# Patient Record
Sex: FEMALE | Race: WHITE
[De-identification: names, ages, dates, MRNs, and addresses within clinical notes are randomized per-mention and may not be internally consistent; named-entity substitution may affect disease eponyms.]

---

## 2018-10-04 ENCOUNTER — RX RENEWAL (OUTPATIENT)
Age: 53
End: 2018-10-04

## 2018-10-04 PROBLEM — Z00.00 ENCOUNTER FOR PREVENTIVE HEALTH EXAMINATION: Status: ACTIVE | Noted: 2018-10-04

## 2018-11-02 ENCOUNTER — APPOINTMENT (OUTPATIENT)
Dept: ENDOCRINOLOGY | Facility: CLINIC | Age: 53
End: 2018-11-02
Payer: SELF-PAY

## 2018-11-02 VITALS
HEIGHT: 64 IN | HEART RATE: 88 BPM | SYSTOLIC BLOOD PRESSURE: 128 MMHG | WEIGHT: 170 LBS | BODY MASS INDEX: 29.02 KG/M2 | DIASTOLIC BLOOD PRESSURE: 70 MMHG

## 2018-11-02 DIAGNOSIS — Z85.850 PERSONAL HISTORY OF MALIGNANT NEOPLASM OF THYROID: ICD-10-CM

## 2018-11-02 DIAGNOSIS — E03.2 HYPOTHYROIDISM DUE TO MEDICAMENTS AND OTHER EXOGENOUS SUBSTANCES: ICD-10-CM

## 2018-11-02 PROCEDURE — 99213 OFFICE O/P EST LOW 20 MIN: CPT

## 2019-01-02 ENCOUNTER — RX RENEWAL (OUTPATIENT)
Age: 54
End: 2019-01-02

## 2019-01-17 ENCOUNTER — RECORD ABSTRACTING (OUTPATIENT)
Age: 54
End: 2019-01-17

## 2019-01-17 DIAGNOSIS — Z82.3 FAMILY HISTORY OF STROKE: ICD-10-CM

## 2019-01-17 DIAGNOSIS — Z83.3 FAMILY HISTORY OF DIABETES MELLITUS: ICD-10-CM

## 2019-01-17 DIAGNOSIS — Z82.49 FAMILY HISTORY OF ISCHEMIC HEART DISEASE AND OTHER DISEASES OF THE CIRCULATORY SYSTEM: ICD-10-CM

## 2019-01-17 DIAGNOSIS — Z86.39 PERSONAL HISTORY OF OTHER ENDOCRINE, NUTRITIONAL AND METABOLIC DISEASE: ICD-10-CM

## 2019-01-30 ENCOUNTER — APPOINTMENT (OUTPATIENT)
Dept: ENDOCRINOLOGY | Facility: CLINIC | Age: 54
End: 2019-01-30
Payer: MEDICAID

## 2019-01-30 ENCOUNTER — LABORATORY RESULT (OUTPATIENT)
Age: 54
End: 2019-01-30

## 2019-01-30 VITALS
BODY MASS INDEX: 29.88 KG/M2 | WEIGHT: 175 LBS | HEIGHT: 64 IN | DIASTOLIC BLOOD PRESSURE: 74 MMHG | OXYGEN SATURATION: 96 % | SYSTOLIC BLOOD PRESSURE: 128 MMHG | HEART RATE: 99 BPM

## 2019-01-30 PROCEDURE — 99213 OFFICE O/P EST LOW 20 MIN: CPT | Mod: 25

## 2019-01-30 PROCEDURE — 36415 COLL VENOUS BLD VENIPUNCTURE: CPT

## 2019-01-31 LAB
T4 FREE SERPL-MCNC: 1.7 NG/DL
THYROGLOB AB SERPL-ACNC: <20 IU/ML
THYROGLOB SERPL-MCNC: <0.2 NG/ML
TSH SERPL-ACNC: 8.12 UIU/ML

## 2019-02-01 ENCOUNTER — MEDICATION RENEWAL (OUTPATIENT)
Age: 54
End: 2019-02-01

## 2019-02-01 RX ORDER — LEVOTHYROXINE SODIUM 0.17 MG/1
175 TABLET ORAL DAILY
Qty: 90 | Refills: 0 | Status: DISCONTINUED | COMMUNITY
Start: 2019-01-02 | End: 2019-02-01

## 2019-04-03 ENCOUNTER — APPOINTMENT (OUTPATIENT)
Dept: ENDOCRINOLOGY | Facility: CLINIC | Age: 54
End: 2019-04-03
Payer: MEDICAID

## 2019-04-03 ENCOUNTER — LABORATORY RESULT (OUTPATIENT)
Age: 54
End: 2019-04-03

## 2019-04-03 VITALS
HEART RATE: 99 BPM | SYSTOLIC BLOOD PRESSURE: 120 MMHG | HEIGHT: 64 IN | BODY MASS INDEX: 30.05 KG/M2 | DIASTOLIC BLOOD PRESSURE: 80 MMHG | WEIGHT: 176 LBS

## 2019-04-03 PROCEDURE — 36415 COLL VENOUS BLD VENIPUNCTURE: CPT

## 2019-04-03 PROCEDURE — 99213 OFFICE O/P EST LOW 20 MIN: CPT | Mod: 25

## 2019-05-21 ENCOUNTER — APPOINTMENT (OUTPATIENT)
Dept: ENDOCRINOLOGY | Facility: CLINIC | Age: 54
End: 2019-05-21

## 2019-06-17 LAB
T4 FREE SERPL-MCNC: 2 NG/DL
THYROGLOB AB SERPL-ACNC: <20 IU/ML
THYROGLOB SERPL-MCNC: <0.2 NG/ML
TSH SERPL-ACNC: 0.19 UIU/ML

## 2019-07-30 ENCOUNTER — RX RENEWAL (OUTPATIENT)
Age: 54
End: 2019-07-30

## 2019-07-30 RX ORDER — LEVOTHYROXINE SODIUM 0.2 MG/1
200 TABLET ORAL DAILY
Qty: 90 | Refills: 1 | Status: DISCONTINUED | COMMUNITY
Start: 2019-02-01 | End: 2019-07-30

## 2019-10-15 ENCOUNTER — RX RENEWAL (OUTPATIENT)
Age: 54
End: 2019-10-15

## 2019-11-15 ENCOUNTER — LABORATORY RESULT (OUTPATIENT)
Age: 54
End: 2019-11-15

## 2019-11-15 ENCOUNTER — APPOINTMENT (OUTPATIENT)
Dept: ENDOCRINOLOGY | Facility: CLINIC | Age: 54
End: 2019-11-15
Payer: COMMERCIAL

## 2019-11-15 VITALS
DIASTOLIC BLOOD PRESSURE: 68 MMHG | WEIGHT: 168 LBS | HEIGHT: 55 IN | BODY MASS INDEX: 38.88 KG/M2 | HEART RATE: 86 BPM | SYSTOLIC BLOOD PRESSURE: 120 MMHG

## 2019-11-15 PROCEDURE — 99406 BEHAV CHNG SMOKING 3-10 MIN: CPT

## 2019-11-15 PROCEDURE — 36415 COLL VENOUS BLD VENIPUNCTURE: CPT

## 2019-11-15 PROCEDURE — 99214 OFFICE O/P EST MOD 30 MIN: CPT | Mod: 25

## 2019-11-15 RX ORDER — CALCIUM CITRATE/VITAMIN D3 315MG-6.25
TABLET ORAL DAILY
Refills: 0 | Status: ACTIVE | COMMUNITY

## 2019-11-15 NOTE — REASON FOR VISIT
[Follow-Up: _____] : a [unfilled] follow-up visit [FreeTextEntry1] : thyroid cancer, post surgical hypothyroid

## 2019-11-15 NOTE — ASSESSMENT
[FreeTextEntry1] : 53 year old female with PTC s/p thyroidectomy and WADDELL ablation (79 mCi) 1/2015 at [place]. Post treatment scan showed foci of uptake in neck- thyroglossal duct and thyroid bed remnant. Neck sono 2017 with residual thyroid tissue. Tg and TgAb levels negative\par -Continue Synthroid 200 mcg daily\par -Check TSH, Tg, and Tg ab now in office\par -Will adjust Synthroid as needed.\par - neck sono needed\par \par Smoker - I discussed at length with the patient concerning cessation of tobacco use.  I reviewed with the patient the adverse affects that smoking has on vascular health, morbidity and mortality. We discussed the use of nicotine replacement, psychiatric counseling and medical therapy.  Counseling time was 3 minutes.  Educational material provided.\par

## 2019-11-15 NOTE — PHYSICAL EXAM
[Alert] : alert [No Acute Distress] : no acute distress [Well Nourished] : well nourished [Well Developed] : well developed [Normal Sclera/Conjunctiva] : normal sclera/conjunctiva [EOMI] : extra ocular movement intact [No Proptosis] : no proptosis [No LAD] : no lymphadenopathy [Well Healed Scar] : well healed scar [Normal Rate] : heart rate was normal  [Normal S1, S2] : normal S1 and S2 [Regular Rhythm] : with a regular rhythm [No Edema] : there was no peripheral edema [Anterior Cervical Nodes] : anterior cervical nodes [Normal] : normal and non tender [No Tremors] : no tremors [Oriented x3] : oriented to person, place, and time [Normal Insight/Judgement] : insight and judgment were intact [Normal Affect] : the affect was normal [Normal Mood] : the mood was normal [de-identified] : bilateral wheeze [de-identified] : no palpable thyroid tissue

## 2019-11-15 NOTE — HISTORY OF PRESENT ILLNESS
[FreeTextEntry1] : Current regimen:\par Synthroid (KALANI) 200 mcg daily, adherent with dosing and administration\par \par Denies dysphagia, anterior neck pain. Reports intermittent voice hoarseness ever since thyroidectomy.\par  [de-identified] : 3/2014: [facility] thyroid FNA- intermediate pathology with suspicious GEC\par \par 6/2/2014: Left hemithyroidectomy, left 1.3 cm and 2 mm papillary thyroid cancer, follicular variant, no lymph vascular invasion, partially encapsulated\par 7/24/2014: right completion thyroidectomy, papillary microcarcinoma x4 measuring 2mm each.\par No lymphovascular invasion\par \par 1/15/15 WADDELL ablation  76.9 mCi at [facility]\par Post treatment scan: foci of uptake in neck- thyroglossal duct and thyroid bed remnant. Tg 7.4, Tg ab neg\par \par Neck sono 12/2017 - residual Left thyroid tissue 1.6x.6x.6 cm, Left neck 4 lymph nodes\par

## 2019-11-15 NOTE — REVIEW OF SYSTEMS
[As Noted in HPI] : as noted in HPI [Anxiety] : anxiety [Fatigue] : no fatigue [Recent Weight Gain (___ Lbs)] : no recent weight gain [Recent Weight Loss (___ Lbs)] : no recent weight loss [Blurry Vision] : no blurred vision [Eye Pain] : no eye pain [Chest Pain] : no chest pain [Palpitations] : no palpitations [Constipation] : no constipation [Diarrhea] : no diarrhea [Hair Loss] : no hair loss [Dry Skin] : no dry skin [Tremors] : no tremors [Dizziness] : no dizziness [Insomnia] : no insomnia [Cold Intolerance] : cold tolerant [Heat Intolerance] : heat tolerant

## 2019-11-22 LAB
T4 FREE SERPL-MCNC: 1.7 NG/DL
THYROGLOB AB SERPL-ACNC: <20 IU/ML
THYROGLOB SERPL-MCNC: <0.2 NG/ML
TSH SERPL-ACNC: 0.13 UIU/ML

## 2019-12-05 ENCOUNTER — CLINICAL ADVICE (OUTPATIENT)
Age: 54
End: 2019-12-05

## 2020-01-07 ENCOUNTER — CLINICAL ADVICE (OUTPATIENT)
Age: 55
End: 2020-01-07

## 2020-01-09 ENCOUNTER — RX RENEWAL (OUTPATIENT)
Age: 55
End: 2020-01-09

## 2020-03-17 ENCOUNTER — APPOINTMENT (OUTPATIENT)
Dept: ENDOCRINOLOGY | Facility: CLINIC | Age: 55
End: 2020-03-17

## 2020-03-24 ENCOUNTER — APPOINTMENT (OUTPATIENT)
Dept: ENDOCRINOLOGY | Facility: CLINIC | Age: 55
End: 2020-03-24
Payer: COMMERCIAL

## 2020-03-24 PROCEDURE — 99441: CPT

## 2020-07-28 ENCOUNTER — LABORATORY RESULT (OUTPATIENT)
Age: 55
End: 2020-07-28

## 2020-07-28 ENCOUNTER — APPOINTMENT (OUTPATIENT)
Dept: ENDOCRINOLOGY | Facility: CLINIC | Age: 55
End: 2020-07-28
Payer: MEDICAID

## 2020-07-28 PROCEDURE — 36415 COLL VENOUS BLD VENIPUNCTURE: CPT

## 2020-07-30 LAB
T4 FREE SERPL-MCNC: 1.1 NG/DL
THYROGLOB AB SERPL-ACNC: <20 IU/ML
THYROGLOB SERPL-MCNC: 0.21 NG/ML
TSH SERPL-ACNC: 41 UIU/ML

## 2020-08-04 ENCOUNTER — APPOINTMENT (OUTPATIENT)
Dept: ENDOCRINOLOGY | Facility: CLINIC | Age: 55
End: 2020-08-04

## 2020-08-18 ENCOUNTER — APPOINTMENT (OUTPATIENT)
Dept: ENDOCRINOLOGY | Facility: CLINIC | Age: 55
End: 2020-08-18
Payer: MEDICAID

## 2020-08-18 VITALS
DIASTOLIC BLOOD PRESSURE: 64 MMHG | SYSTOLIC BLOOD PRESSURE: 110 MMHG | HEIGHT: 55 IN | OXYGEN SATURATION: 96 % | TEMPERATURE: 98.6 F | BODY MASS INDEX: 39.11 KG/M2 | WEIGHT: 169 LBS | HEART RATE: 89 BPM

## 2020-08-18 PROCEDURE — 99214 OFFICE O/P EST MOD 30 MIN: CPT

## 2020-08-18 NOTE — PHYSICAL EXAM
[Alert] : alert [Well Nourished] : well nourished [Healthy Appearance] : healthy appearance [No Acute Distress] : no acute distress [EOMI] : extra ocular movement intact [No LAD] : no lymphadenopathy [Well Healed Scar] : well healed scar [No Accessory Muscle Use] : no accessory muscle use [Clear to Auscultation] : lungs were clear to auscultation bilaterally [Normal S1, S2] : normal S1 and S2 [Normal Rate] : heart rate was normal [No Edema] : no peripheral edema [Normal Bowel Sounds] : normal bowel sounds [Not Tender] : non-tender [Cranial Nerves Intact] : cranial nerves 2-12 were intact [Soft] : abdomen soft [Normal Reflexes] : deep tendon reflexes were 2+ and symmetric [No Tremors] : no tremors [Oriented x3] : oriented to person, place, and time [Normal Affect] : the affect was normal [Normal Insight/Judgement] : insight and judgment were intact [Normal Mood] : the mood was normal [de-identified] : mild periorbital edema

## 2020-08-18 NOTE — HISTORY OF PRESENT ILLNESS
[de-identified] : 3/2014: [facility] thyroid FNA- intermediate pathology with suspicious GEC\par \par 6/2/2014: Left hemithyroidectomy, left 1.3 cm and 2 mm papillary thyroid cancer, follicular variant, no lymph vascular invasion, partially encapsulated\par 7/24/2014: right completion thyroidectomy, papillary microcarcinoma x4 measuring 2mm each.\par No lymphovascular invasion\par \par 1/15/15 WADDELL ablation  76.9 mCi at [facility]\par Post treatment scan: foci of uptake in neck- thyroglossal duct and thyroid bed remnant. Tg 7.4, Tg ab neg\par \par Neck sono 12/2017 - residual Left thyroid tissue 1.6x.6x.6 cm, Left neck 4 lymph nodes\par Neck sono 1/2/20: residual Left thyroid tissue 19x6x6 mm, no lymphadenopathy\par \par Current regimen: Synthroid (KALANI) 200 mcg daily, has been nonadherent, missed a few days\par \par Current sx: Denies dysphagia, anterior neck pain. Reports intermittent voice hoarseness ever since thyroidectomy.\par \par

## 2020-08-18 NOTE — ASSESSMENT
[FreeTextEntry1] : 53 year old female with PTC s/p thyroidectomy and WADDELL ablation (79 mCi) 1/2015 at [place]. Post treatment scan showed foci of uptake in neck- thyroglossal duct and thyroid bed remnant. Neck sono 2017 and 2020 with residual thyroid tissue. \par Labs 7/2020 TSH 40, (+) Tg 0.21, has been nonadherent w Lt4\par - discussed risks of untreated hypothyroid\par - must adhere w thyroid hormone\par -Continue Synthroid 200 mcg daily, reepeat TFTs 6-8week\par -thyroid WBS needed, Rx given, pt to go to [facility]\par \par

## 2020-08-18 NOTE — REVIEW OF SYSTEMS
[Recent Weight Loss (___ Lbs)] : no recent weight loss [Recent Weight Gain (___ Lbs)] : no recent weight gain [Palpitations] : no palpitations [Chest Pain] : no chest pain [SOB on Exertion] : no shortness of breath on exertion [Shortness Of Breath] : no shortness of breath [Constipation] : no constipation [Diarrhea] : no diarrhea [Joint Pain] : no joint pain [Tremors] : no tremors [Myalgia] : no myalgia  [Depression] : no depression [Anxiety] : no anxiety [Cold Intolerance] : no cold intolerance [Heat Intolerance] : no heat intolerance

## 2020-08-27 ENCOUNTER — APPOINTMENT (OUTPATIENT)
Dept: ENDOCRINOLOGY | Facility: CLINIC | Age: 55
End: 2020-08-27
Payer: MEDICAID

## 2020-08-27 PROCEDURE — 36415 COLL VENOUS BLD VENIPUNCTURE: CPT

## 2020-09-03 LAB
ANION GAP SERPL CALC-SCNC: 10 MMOL/L
BUN SERPL-MCNC: 22 MG/DL
CALCIUM SERPL-MCNC: 9.2 MG/DL
CHLORIDE SERPL-SCNC: 111 MMOL/L
CO2 SERPL-SCNC: 24 MMOL/L
CREAT SERPL-MCNC: 0.9 MG/DL
GLUCOSE SERPL-MCNC: 93 MG/DL
POTASSIUM SERPL-SCNC: 3.9 MMOL/L
SODIUM SERPL-SCNC: 144 MMOL/L

## 2020-09-28 ENCOUNTER — APPOINTMENT (OUTPATIENT)
Dept: NUCLEAR MEDICINE | Facility: CLINIC | Age: 55
End: 2020-09-28
Payer: MEDICAID

## 2020-09-28 ENCOUNTER — OUTPATIENT (OUTPATIENT)
Dept: OUTPATIENT SERVICES | Facility: HOSPITAL | Age: 55
LOS: 1 days | End: 2020-09-28

## 2020-09-28 DIAGNOSIS — Z00.8 ENCOUNTER FOR OTHER GENERAL EXAMINATION: ICD-10-CM

## 2020-09-29 ENCOUNTER — APPOINTMENT (OUTPATIENT)
Dept: NUCLEAR MEDICINE | Facility: CLINIC | Age: 55
End: 2020-09-29

## 2020-09-30 ENCOUNTER — APPOINTMENT (OUTPATIENT)
Dept: NUCLEAR MEDICINE | Facility: CLINIC | Age: 55
End: 2020-09-30

## 2020-09-30 ENCOUNTER — RESULT REVIEW (OUTPATIENT)
Age: 55
End: 2020-09-30

## 2020-10-02 ENCOUNTER — APPOINTMENT (OUTPATIENT)
Dept: NUCLEAR MEDICINE | Facility: CLINIC | Age: 55
End: 2020-10-02

## 2020-10-02 ENCOUNTER — LABORATORY RESULT (OUTPATIENT)
Age: 55
End: 2020-10-02

## 2020-10-02 PROCEDURE — 78018 THYROID MET IMAGING BODY: CPT | Mod: 26

## 2020-10-21 ENCOUNTER — APPOINTMENT (OUTPATIENT)
Dept: ENDOCRINOLOGY | Facility: CLINIC | Age: 55
End: 2020-10-21

## 2020-11-03 ENCOUNTER — APPOINTMENT (OUTPATIENT)
Dept: ENDOCRINOLOGY | Facility: CLINIC | Age: 55
End: 2020-11-03
Payer: MEDICAID

## 2020-11-03 VITALS
HEART RATE: 90 BPM | OXYGEN SATURATION: 96 % | SYSTOLIC BLOOD PRESSURE: 136 MMHG | BODY MASS INDEX: 28.51 KG/M2 | WEIGHT: 167 LBS | HEIGHT: 64 IN | DIASTOLIC BLOOD PRESSURE: 70 MMHG

## 2020-11-03 PROCEDURE — 99072 ADDL SUPL MATRL&STAF TM PHE: CPT

## 2020-11-03 PROCEDURE — 99214 OFFICE O/P EST MOD 30 MIN: CPT

## 2020-11-03 NOTE — HISTORY OF PRESENT ILLNESS
[FreeTextEntry1] : Interval Hx - no issues, no changes\par  to have lymph node FNA 11/18/20 [de-identified] : 3/2014: [facility] thyroid FNA- intermediate pathology with suspicious GEC\par \par 6/2/2014: Left hemithyroidectomy, left 1.3 cm and 2 mm papillary thyroid cancer, follicular variant, no lymph vascular invasion, partially encapsulated\par 7/24/2014: right completion thyroidectomy, papillary microcarcinoma x4 measuring 2mm each.\par No lymphovascular invasion\par \par 1/15/15 WADDELL ablation  76.9 mCi at [facility]\par Post treatment scan: foci of uptake in neck- thyroglossal duct and thyroid bed remnant. Tg 7.4, Tg ab neg\par \par Neck sono 12/2017 - residual Left thyroid tissue 1.6x.6x.6 cm, Left neck 4 lymph nodes\par Neck sono 1/2/20: residual Left thyroid tissue 19x6x6 mm, no lymphadenopathy\par \par Neck sonogram 8/27/20\par s/p total thyroidectomy\par Right thyroid bed ovoid tissue 14x4x5 mm - smaller\par Left thyroid bed ovoid tissue 25x7x5 mm - stable\par Left Level 2 Lymph node [id number] mm - no defined hilum, new\par Right Level 3 lymph node 19x4x7 mm - benign appearing\par \par Thyrogen WBS and Tg levels 10/2/20\par Tg Level <0.20\par scan: 48 hour uptake <1%, no evidence of iodine avid tissue in neck no distant mets\par \par Current regimen: Synthroid (KALANI) 200 mcg daily, has been nonadherent, missed a few days\par \par Current sx: Denies dysphagia, anterior neck pain. Reports intermittent voice hoarseness ever since thyroidectomy.\par \par

## 2020-11-03 NOTE — PHYSICAL EXAM
[Alert] : alert [Well Nourished] : well nourished [Healthy Appearance] : healthy appearance [No Acute Distress] : no acute distress [EOMI] : extra ocular movement intact [No LAD] : no lymphadenopathy [Well Healed Scar] : well healed scar [No Accessory Muscle Use] : no accessory muscle use [Clear to Auscultation] : lungs were clear to auscultation bilaterally [Normal S1, S2] : normal S1 and S2 [Normal Rate] : heart rate was normal [No Edema] : no peripheral edema [Normal Bowel Sounds] : normal bowel sounds [Not Tender] : non-tender [Soft] : abdomen soft [Cranial Nerves Intact] : cranial nerves 2-12 were intact [Normal Reflexes] : deep tendon reflexes were 2+ and symmetric [No Tremors] : no tremors [Oriented x3] : oriented to person, place, and time [Normal Affect] : the affect was normal [Normal Insight/Judgement] : insight and judgment were intact [Normal Mood] : the mood was normal [de-identified] : mild periorbital edema

## 2020-11-03 NOTE — REVIEW OF SYSTEMS
[Recent Weight Gain (___ Lbs)] : no recent weight gain [Recent Weight Loss (___ Lbs)] : no recent weight loss [Chest Pain] : no chest pain [Palpitations] : no palpitations [Shortness Of Breath] : no shortness of breath [SOB on Exertion] : no shortness of breath on exertion [Constipation] : no constipation [Diarrhea] : no diarrhea [Joint Pain] : no joint pain [Myalgia] : no myalgia  [Tremors] : no tremors [Depression] : no depression [Anxiety] : no anxiety [Cold Intolerance] : no cold intolerance [Heat Intolerance] : no heat intolerance

## 2020-11-03 NOTE — ASSESSMENT
[FreeTextEntry1] : 55 year old female with PTC s/p thyroidectomy and WADDELL ablation (79 mCi) 1/2015 at [place]. Post treatment scan showed foci of uptake in neck- thyroglossal duct and thyroid bed remnant. Neck sono 2017 and 2020 with residual thyroid tissue and enlarged lymph nodes. recent Thyrogen WBS and Tg level - [facility]\par -Continue Synthroid 200 mcg daily, repeat TFTs needed\par - to have lymph node FNA biopsy\par \par

## 2020-12-21 ENCOUNTER — NON-APPOINTMENT (OUTPATIENT)
Age: 55
End: 2020-12-21

## 2020-12-23 ENCOUNTER — RX RENEWAL (OUTPATIENT)
Age: 55
End: 2020-12-23

## 2020-12-29 ENCOUNTER — LABORATORY RESULT (OUTPATIENT)
Age: 55
End: 2020-12-29

## 2020-12-30 ENCOUNTER — NON-APPOINTMENT (OUTPATIENT)
Age: 55
End: 2020-12-30

## 2021-02-19 ENCOUNTER — NON-APPOINTMENT (OUTPATIENT)
Age: 56
End: 2021-02-19

## 2021-03-20 ENCOUNTER — APPOINTMENT (OUTPATIENT)
Dept: ENDOCRINOLOGY | Facility: CLINIC | Age: 56
End: 2021-03-20

## 2021-10-16 ENCOUNTER — APPOINTMENT (OUTPATIENT)
Dept: ENDOCRINOLOGY | Facility: CLINIC | Age: 56
End: 2021-10-16

## 2022-04-30 ENCOUNTER — APPOINTMENT (OUTPATIENT)
Dept: ENDOCRINOLOGY | Facility: CLINIC | Age: 57
End: 2022-04-30
Payer: MEDICAID

## 2022-04-30 VITALS
HEART RATE: 87 BPM | OXYGEN SATURATION: 96 % | SYSTOLIC BLOOD PRESSURE: 126 MMHG | HEIGHT: 64 IN | BODY MASS INDEX: 26.46 KG/M2 | WEIGHT: 155 LBS | DIASTOLIC BLOOD PRESSURE: 70 MMHG

## 2022-04-30 PROCEDURE — 99214 OFFICE O/P EST MOD 30 MIN: CPT

## 2022-04-30 NOTE — REVIEW OF SYSTEMS
[Recent Weight Gain (___ Lbs)] : no recent weight gain [Recent Weight Loss (___ Lbs)] : no recent weight loss [As Noted in HPI] : as noted in HPI [Chest Pain] : no chest pain [Palpitations] : no palpitations [Shortness Of Breath] : no shortness of breath [SOB on Exertion] : no shortness of breath on exertion [Constipation] : no constipation [Diarrhea] : no diarrhea [Joint Pain] : no joint pain [Myalgia] : no myalgia  [Tremors] : no tremors [Depression] : no depression [Anxiety] : no anxiety [Cold Intolerance] : no cold intolerance [Heat Intolerance] : no heat intolerance

## 2022-04-30 NOTE — ASSESSMENT
[FreeTextEntry1] : 56 year old female with PTC s/p thyroidectomy and WADDELL ablation (79 mCi) 1/2015 at [place]. Post treatment scan showed foci of uptake in neck- thyroglossal duct and thyroid bed remnant. Neck sono 2017 and 2020 with residual thyroid tissue and enlarged lymph nodes. 10/2020 Thyrogen WBS and Tg level - [facility]I. No recent lab or sono\par - Continue Synthroid 200 mcg daily, rx sent\par - repeat TFTs needed\par - check neck sono\par - check Tg and Tg ab levels\par \par will advise further based on result\par \par Voice hoarseness - advised ENT eval\par \par

## 2022-04-30 NOTE — PHYSICAL EXAM
[Alert] : alert [Well Nourished] : well nourished [Healthy Appearance] : healthy appearance [No Acute Distress] : no acute distress [EOMI] : extra ocular movement intact [No LAD] : no lymphadenopathy [Well Healed Scar] : well healed scar [No Accessory Muscle Use] : no accessory muscle use [Clear to Auscultation] : lungs were clear to auscultation bilaterally [Normal S1, S2] : normal S1 and S2 [Normal Rate] : heart rate was normal [No Edema] : no peripheral edema [Normal Bowel Sounds] : normal bowel sounds [Not Tender] : non-tender [Soft] : abdomen soft [Cranial Nerves Intact] : cranial nerves 2-12 were intact [Normal Reflexes] : deep tendon reflexes were 2+ and symmetric [No Tremors] : no tremors [Oriented x3] : oriented to person, place, and time [Normal Affect] : the affect was normal [Normal Insight/Judgement] : insight and judgment were intact [Normal Mood] : the mood was normal [de-identified] : mild periorbital edema

## 2022-04-30 NOTE — HISTORY OF PRESENT ILLNESS
[FreeTextEntry1] : Interval Hx - last seen in 2020, no issue, no changes\par \par Thyroid cancer:\par 3/2014: [facility] thyroid FNA- intermediate pathology with suspicious GEC\par \par 6/2/2014: Left hemithyroidectomy, left 1.3 cm and 2 mm papillary thyroid cancer, follicular variant, no lymph vascular invasion, partially encapsulated\par 7/24/2014: right completion thyroidectomy, papillary microcarcinoma x4 measuring 2mm each.\par No lymphovascular invasion\par \par 1/15/15 WADDELL ablation  76.9 mCi at [facility]\par Post treatment scan: foci of uptake in neck- thyroglossal duct and thyroid bed remnant. Tg 7.4, Tg ab neg\par \par Neck sono 12/2017 - residual Left thyroid tissue 1.6x.6x.6 cm, Left neck 4 lymph nodes\par Neck sono 1/2/20: residual Left thyroid tissue 19x6x6 mm, no lymphadenopathy\par \par Neck sonogram 8/27/20\par s/p total thyroidectomy\par Right thyroid bed ovoid tissue 14x4x5 mm - smaller\par Left thyroid bed ovoid tissue 25x7x5 mm - stable\par Left Level 2 Lymph node [id number] mm - no defined hilum, new\par Right Level 3 lymph node 19x4x7 mm - benign appearing\par \par Thyrogen WBS and Tg levels 10/2/20\par Tg Level <0.20\par scan: 48 hour uptake <1%, no evidence of iodine avid tissue in neck no distant mets\par \par Left neck lymph node FNA biopsy 12/9/20\par reactive lymph node\par -------------------------------------------------------------------------------------\par \par Current regimen: Synthroid (KALANI) 200 mcg daily - adherent with dosing and admin\par \par Current sx: Denies dysphagia, anterior neck pain. Reports intermittent voice hoarseness ever since thyroidectomy -[place] [facility]\par \par

## 2022-05-17 ENCOUNTER — NON-APPOINTMENT (OUTPATIENT)
Age: 57
End: 2022-05-17

## 2022-08-15 ENCOUNTER — RX RENEWAL (OUTPATIENT)
Age: 57
End: 2022-08-15

## 2022-08-24 ENCOUNTER — LABORATORY RESULT (OUTPATIENT)
Age: 57
End: 2022-08-24

## 2022-08-25 LAB
T4 FREE SERPL-MCNC: 2.6 NG/DL
TSH SERPL-ACNC: 0.01 UIU/ML

## 2022-08-26 LAB
THYROGLOB AB SERPL-ACNC: <20 IU/ML
THYROGLOB SERPL-MCNC: <0.2 NG/ML

## 2022-08-27 ENCOUNTER — APPOINTMENT (OUTPATIENT)
Dept: ENDOCRINOLOGY | Facility: CLINIC | Age: 57
End: 2022-08-27

## 2022-08-27 VITALS
BODY MASS INDEX: 25.44 KG/M2 | HEART RATE: 90 BPM | WEIGHT: 149 LBS | SYSTOLIC BLOOD PRESSURE: 134 MMHG | HEIGHT: 64 IN | DIASTOLIC BLOOD PRESSURE: 74 MMHG

## 2022-08-27 DIAGNOSIS — R59.9 ENLARGED LYMPH NODES, UNSPECIFIED: ICD-10-CM

## 2022-08-27 PROCEDURE — 99214 OFFICE O/P EST MOD 30 MIN: CPT

## 2022-08-27 NOTE — PHYSICAL EXAM
[Alert] : alert [Well Nourished] : well nourished [Healthy Appearance] : healthy appearance [No Acute Distress] : no acute distress [EOMI] : extra ocular movement intact [No LAD] : no lymphadenopathy [Well Healed Scar] : well healed scar [No Accessory Muscle Use] : no accessory muscle use [Clear to Auscultation] : lungs were clear to auscultation bilaterally [Normal S1, S2] : normal S1 and S2 [Normal Rate] : heart rate was normal [No Edema] : no peripheral edema [Normal Reflexes] : deep tendon reflexes were 2+ and symmetric [No Tremors] : no tremors [Oriented x3] : oriented to person, place, and time [Normal Affect] : the affect was normal [Normal Insight/Judgement] : insight and judgment were intact [Normal Mood] : the mood was normal

## 2022-08-27 NOTE — REVIEW OF SYSTEMS
[As Noted in HPI] : as noted in HPI [Recent Weight Loss (___ Lbs)] : recent weight loss: [unfilled] lbs [Chest Pain] : no chest pain [Palpitations] : no palpitations [Shortness Of Breath] : no shortness of breath [SOB on Exertion] : no shortness of breath on exertion [Constipation] : no constipation [Diarrhea] : no diarrhea [Joint Pain] : no joint pain [Myalgia] : no myalgia  [Tremors] : no tremors [Depression] : no depression [Anxiety] : no anxiety [Cold Intolerance] : no cold intolerance [Heat Intolerance] : no heat intolerance

## 2022-08-27 NOTE — ASSESSMENT
[FreeTextEntry1] : 56 year old female with PTC s/p thyroidectomyin 2014 and WADDELL ablation (79 mCi) 1/2015 at [place]. Post treatment scan showed foci of uptake in neck- thyroglossal duct and thyroid bed remnant. Neck sono 2017 and 2020 with residual thyroid tissue and enlarged lymph nodes. Neck lymph node FNA biopsy showed reactive lymph node. 10/2020 Thyrogen WBS and Tg level - KERI. Recent sonogram 2022 without recurrence/abnormal lymph nodes and unstimulated Tg levels negative and TSH suppressed with elevated FT4 levels\par - no signs of recurrence, >5 years postsurgery - will allow TSH to rise, discussed that long term TSH suppression can increase risks of Afib and/or osteoporosis\par - decrease Synthroid 175 mcg daily, rx sent\par - repeat TFTs 3 months\par - check neck sonoyearly\par - monitor Tg and Tg ab levels\par \par \par Voice hoarseness - advised ENT eval\par \par

## 2022-08-27 NOTE — HISTORY OF PRESENT ILLNESS
[FreeTextEntry1] : Interval Hx - right shoulder pains to have an MRI\par \par Thyroid cancer:\par 3/2014: [facility] thyroid FNA- intermediate pathology with suspicious GEC\par \par 6/2/2014: Left hemithyroidectomy, left 1.3 cm and 2 mm papillary thyroid cancer, follicular variant, no lymph vascular invasion, partially encapsulated\par \par 7/24/2014: right completion thyroidectomy, papillary microcarcinoma x4 measuring 2mm each.\par No lymphovascular invasion\par \par 1/15/15 WADDELL ablation  76.9 mCi at [facility]\par Post treatment scan: foci of uptake in neck- thyroglossal duct and thyroid bed remnant. Tg 7.4, Tg ab neg\par \par Neck sono 12/2017 - residual Left thyroid tissue 1.6x.6x.6 cm, Left neck 4 lymph nodes\par Neck sono 1/2/20: residual Left thyroid tissue 19x6x6 mm, no lymphadenopathy\par Neck sonogram 8/27/20\par s/p total thyroidectomy\par Right thyroid bed ovoid tissue 14x4x5 mm - smaller\par Left thyroid bed ovoid tissue 25x7x5 mm - stable\par Left Level 2 Lymph node [id number] mm - no defined hilum, new\par Right Level 3 lymph node 19x4x7 mm - benign appearing\par \par Thyrogen WBS and Tg levels 10/2/20\par Tg Level <0.20\par scan: 48 hour uptake <1%, no evidence of iodine avid tissue in neck no distant mets\par \par Left neck lymph node FNA biopsy 12/9/20\par reactive lymph node\par \par Neck sono 5/5/22 no residual thyroid tissue, no suspicious lymph nodes\par -------------------------------------------------------------------------------------\par \par Current regimen: Synthroid (KALANI) 200 mcg daily - adherent with dosing and admin\par \par Current sx: Denies dysphagia, anterior neck pain. Reports intermittent voice hoarseness ever since thyroidectomy -did not see ENT\par \par

## 2022-11-25 ENCOUNTER — LABORATORY RESULT (OUTPATIENT)
Age: 57
End: 2022-11-25

## 2022-11-26 ENCOUNTER — APPOINTMENT (OUTPATIENT)
Dept: ENDOCRINOLOGY | Facility: CLINIC | Age: 57
End: 2022-11-26

## 2022-11-26 VITALS
WEIGHT: 149 LBS | DIASTOLIC BLOOD PRESSURE: 78 MMHG | HEART RATE: 95 BPM | BODY MASS INDEX: 25.44 KG/M2 | SYSTOLIC BLOOD PRESSURE: 128 MMHG | OXYGEN SATURATION: 98 % | HEIGHT: 64 IN

## 2022-11-26 LAB
T4 FREE SERPL-MCNC: 1.9 NG/DL
THYROGLOB AB SERPL-ACNC: <20 IU/ML
THYROGLOB SERPL-MCNC: <0.2 NG/ML
TSH SERPL-ACNC: 0.07 UIU/ML

## 2022-11-26 PROCEDURE — 99214 OFFICE O/P EST MOD 30 MIN: CPT

## 2022-11-26 RX ORDER — LEVOTHYROXINE SODIUM 175 UG/1
175 TABLET ORAL DAILY
Qty: 90 | Refills: 1 | Status: DISCONTINUED | COMMUNITY
Start: 2019-04-03 | End: 2022-11-26

## 2022-11-26 NOTE — HISTORY OF PRESENT ILLNESS
[FreeTextEntry1] : INTERVAL HISTORY: c/o bulgining in area of collar bone. Saw PCP and had CT scan, told she has arthritis.\par Still with c/o intermittent voice hoarseness ever since thyroidectomy. Did not see ENT. Thinks it is worse currently because she feels like she is coming down with a cold.\par \par Current regimen: Synthroid (KALANI) 175 mcg daily, takes appropriately.\par Denies dysphagia and anterior neck pain.\par Sweats a lot, but feels this has decreased since Synthroid dose was decreased 3 months ago.\par \par Thyroid cancer:\par 3/2014: LLP thyroid FNA- intermediate pathology with suspicious GEC\par \par 6/2/2014: Left hemithyroidectomy, left 1.3 cm and 2 mm papillary thyroid cancer, follicular variant, no lymph vascular invasion, partially encapsulated\par \par 7/24/2014: right completion thyroidectomy, papillary microcarcinoma x4 measuring 2mm each.\par No lymphovascular invasion\par \par 1/15/15 WADDELL ablation 76.9 mCi at [facility]\par Post treatment scan: foci of uptake in neck- thyroglossal duct and thyroid bed remnant. Tg 7.4, Tg ab neg\par \par Neck sono 12/2017 - residual Left thyroid tissue 1.6x.6x.6 cm, Left neck 4 lymph nodes\par Neck sono 1/2/20: residual Left thyroid tissue 19x6x6 mm, no lymphadenopathy\par Neck sonogram 8/27/20\par s/p total thyroidectomy\par Right thyroid bed ovoid tissue 14x4x5 mm - smaller\par Left thyroid bed ovoid tissue 25x7x5 mm - stable\par Left Level 2 Lymph node [id number] mm - no defined hilum, new\par Right Level 3 lymph node 19x4x7 mm - benign appearing\par \par Thyrogen WBS and Tg levels 10/2/20\par Tg Level <0.20\par scan: 48 hour uptake <1%, no evidence of iodine avid tissue in neck no distant mets\par \par Left neck lymph node FNA biopsy 12/9/20\par reactive lymph node\par \par Neck sono 5/5/22 no residual thyroid tissue, no suspicious lymph nodes

## 2022-11-26 NOTE — REVIEW OF SYSTEMS
[Recent Weight Loss (___ Lbs)] : recent weight loss: [unfilled] lbs [Dysphonia] : dysphonia [Fatigue] : no fatigue [Recent Weight Gain (___ Lbs)] : no recent weight gain [Dysphagia] : no dysphagia [Neck Pain] : no neck pain [Chest Pain] : no chest pain [Palpitations] : no palpitations [Shortness Of Breath] : no shortness of breath [Constipation] : no constipation [Diarrhea] : no diarrhea [Tremors] : no tremors [Depression] : no depression [Anxiety] : no anxiety [Cold Intolerance] : no cold intolerance [Heat Intolerance] : no heat intolerance [FreeTextEntry2] : been exercising more [de-identified] : sweats a lot

## 2022-11-26 NOTE — ASSESSMENT
[FreeTextEntry1] : 57 year old female with hypothyroidism s/p thyroidectomy in 2014 and WADDELL ablation (79 mCi) January 2015 for PTC.\par Post treatment scan showed foci of uptake in neck- thyroglossal duct and thyroid bed remnant. Neck sono 2017 and 2020 with residual thyroid tissue and enlarged lymph nodes. Neck lymph node FNA showed reactive lymph node. Thyrogen WBS and Tg level October 2020 with [facility]I. Sonogram May 2022 without recurrence/abnormal lymph nodes and unstimulated Tg levels negative. Synthroid decreased at last visit 3 months ago. TSH remains suppressed at 0.07 with elevated free T4 of 1.9. Tg undetectable with Tg ab negative. \par -DIscussed with Dr Landers. Given no signs of recurrence and >5 years post surgery, will allow TSH to slowly rise. Current TSH goal 0.1 to 0.5.\par -Decrease Synthroid to 150 mcg daily.\par -Repeat TFTs with Tg and Tg ab in 3 months.\par -Repeat neck sonogram May 2023.\par -Follow-up with ENT for voice hoarseness.\par

## 2022-11-26 NOTE — PHYSICAL EXAM
[Alert] : alert [Well Nourished] : well nourished [No Acute Distress] : no acute distress [Well Developed] : well developed [Normal Sclera/Conjunctiva] : normal sclera/conjunctiva [EOMI] : extra ocular movement intact [No Proptosis] : no proptosis [Well Healed Scar] : well healed scar [No Respiratory Distress] : no respiratory distress [No Accessory Muscle Use] : no accessory muscle use [Clear to Auscultation] : lungs were clear to auscultation bilaterally [Normal S1, S2] : normal S1 and S2 [Normal Rate] : heart rate was normal [Regular Rhythm] : with a regular rhythm [No Edema] : no peripheral edema [Normal Anterior Cervical Nodes] : no anterior cervical lymphadenopathy [Normal Posterior Cervical Nodes] : no posterior cervical lymphadenopathy [No Tremors] : no tremors [Oriented x3] : oriented to person, place, and time [Normal Affect] : the affect was normal [Normal Mood] : the mood was normal [de-identified] : protrusion of right collar bone

## 2023-05-30 ENCOUNTER — APPOINTMENT (OUTPATIENT)
Dept: ENDOCRINOLOGY | Facility: CLINIC | Age: 58
End: 2023-05-30

## 2023-07-19 ENCOUNTER — APPOINTMENT (OUTPATIENT)
Dept: ENDOCRINOLOGY | Facility: CLINIC | Age: 58
End: 2023-07-19

## 2023-09-25 ENCOUNTER — RX RENEWAL (OUTPATIENT)
Age: 58
End: 2023-09-25

## 2023-10-27 ENCOUNTER — RX RENEWAL (OUTPATIENT)
Age: 58
End: 2023-10-27

## 2023-10-27 ENCOUNTER — APPOINTMENT (OUTPATIENT)
Dept: ENDOCRINOLOGY | Facility: CLINIC | Age: 58
End: 2023-10-27
Payer: COMMERCIAL

## 2023-10-27 VITALS
SYSTOLIC BLOOD PRESSURE: 120 MMHG | DIASTOLIC BLOOD PRESSURE: 74 MMHG | HEIGHT: 64 IN | BODY MASS INDEX: 25.61 KG/M2 | WEIGHT: 150 LBS | HEART RATE: 81 BPM | OXYGEN SATURATION: 96 %

## 2023-10-27 DIAGNOSIS — C73 MALIGNANT NEOPLASM OF THYROID GLAND: ICD-10-CM

## 2023-10-27 DIAGNOSIS — I25.10 ATHEROSCLEROTIC HEART DISEASE OF NATIVE CORONARY ARTERY W/OUT ANGINA PECTORIS: ICD-10-CM

## 2023-10-27 DIAGNOSIS — Z95.5 ATHEROSCLEROTIC HEART DISEASE OF NATIVE CORONARY ARTERY W/OUT ANGINA PECTORIS: ICD-10-CM

## 2023-10-27 DIAGNOSIS — E89.0 POSTPROCEDURAL HYPOTHYROIDISM: ICD-10-CM

## 2023-10-27 DIAGNOSIS — R49.0 DYSPHONIA: ICD-10-CM

## 2023-10-27 DIAGNOSIS — F17.200 NICOTINE DEPENDENCE, UNSPECIFIED, UNCOMPLICATED: ICD-10-CM

## 2023-10-27 PROCEDURE — 99214 OFFICE O/P EST MOD 30 MIN: CPT | Mod: 25

## 2023-10-27 PROCEDURE — 99406 BEHAV CHNG SMOKING 3-10 MIN: CPT

## 2024-02-14 ENCOUNTER — APPOINTMENT (OUTPATIENT)
Dept: ENDOCRINOLOGY | Facility: CLINIC | Age: 59
End: 2024-02-14

## 2024-03-25 ENCOUNTER — RX RENEWAL (OUTPATIENT)
Age: 59
End: 2024-03-25

## 2024-03-25 RX ORDER — LEVOTHYROXINE SODIUM 150 UG/1
150 TABLET ORAL
Qty: 90 | Refills: 0 | Status: ACTIVE | COMMUNITY
Start: 2022-11-26 | End: 1900-01-01

## 2024-04-22 ENCOUNTER — OFFICE (OUTPATIENT)
Dept: URBAN - METROPOLITAN AREA CLINIC 38 | Facility: CLINIC | Age: 59
Setting detail: OPHTHALMOLOGY
End: 2024-04-22
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
## 2024-04-22 PROCEDURE — NO SHOW FE NO SHOW FEE: Performed by: OPHTHALMOLOGY

## 2024-05-24 ENCOUNTER — APPOINTMENT (OUTPATIENT)
Dept: ENDOCRINOLOGY | Facility: CLINIC | Age: 59
End: 2024-05-24

## 2024-07-11 ENCOUNTER — RX RENEWAL (OUTPATIENT)
Age: 59
End: 2024-07-11

## 2024-08-29 ENCOUNTER — APPOINTMENT (OUTPATIENT)
Dept: ENDOCRINOLOGY | Facility: CLINIC | Age: 59
End: 2024-08-29

## 2024-11-18 ENCOUNTER — RX RENEWAL (OUTPATIENT)
Age: 59
End: 2024-11-18

## 2025-02-17 ENCOUNTER — RX RENEWAL (OUTPATIENT)
Age: 60
End: 2025-02-17

## 2025-03-12 ENCOUNTER — APPOINTMENT (OUTPATIENT)
Dept: ENDOCRINOLOGY | Facility: CLINIC | Age: 60
End: 2025-03-12

## 2025-07-01 ENCOUNTER — APPOINTMENT (OUTPATIENT)
Dept: ENDOCRINOLOGY | Facility: CLINIC | Age: 60
End: 2025-07-01